# Patient Record
(demographics unavailable — no encounter records)

---

## 2025-06-30 NOTE — HISTORY OF PRESENT ILLNESS
[Home] : at home, [unfilled] , at the time of the visit. [Other Location: e.g. Home (Enter Location, City,State)___] : at [unfilled] [Telephone (audio)] : This telephonic visit was provided via audio only technology. [Verbal consent obtained from patient] : the patient, [unfilled] [FreeTextEntry1] : F/u post hospitalization at [facility]  [de-identified] : Brief Hospital Course copied: 71-year-old gentleman with PMH of HOCM (s/p septal myectomy in 2019) and VT cardiac arrest s/p ST Cristian AICD and mitral valve repair in [place] '20, Non-obstructive CAD via cardiac cath (in 2019), PE/ DVT (On Dabigatran), PAF (with Left atrial appendage closure in 2019 during MVR), HTN, HLD, DM, BPH, Bladder and prostate cancer (Chronic Levine Catheter) who presented to the ED with complaint of frequent falls and foul smelling urine. Patient states he has been unsteady on his feet for "many months", has fallen intermittently but fell three times today, denies hitting his head or losing consciousness but is on Pradaxa. He has had a Levine since October 2025, reports the urine has been "really smelly and a little bloody sometimes" for the last 2 weeks. Denies fever, chills, chest pain, SOB, N/V/D, abdominal pain, weakness, numbness, tingling, or any other complaints. Levine replaced in ED. Admitted for UTI and frequent falls CT HEAD: No acute intracranial hemorrhage, mass effect, or midline shift. Calvarium intact. Chronic microvascular ischemic changes, with interval chronic infarct identified within the left parietal deep white matter. CT CERVICAL SPINE: No acute fracture or traumatic subluxation. Multilevel cervical spondylosis described above. Stable anterior displacement of the bilateral condylar head at the temporomandibular joints, without fracture.   Telephone visit made with pt. He is alert and oriented x 3. Pt and his wife preset. JUDITH Naik also present. Pt has radical prostatectomy not with indwelling levine, unable to leave home for medical f/us due to social/financial/transportation issues. RN to place SW for assistance with issues. Encouraged p to maintain adequate hydration as they are reporting dark color to his urine. RN denies any s/s infection noted. Holding Amlodipine and Metoprolol as pt with dizziness and falls - BP today 118/60 wife reports systolic in the 80s at nights.  RN/SW to assist pt with getting a visiting provider.  Pt still smoking states "this is my one vice so I'm not wanting to stop now", Instructed him on safe smoking and cutting down.

## 2025-06-30 NOTE — PLAN
[FreeTextEntry1] : 1.continue all medications as prescribed 2. f/u with CARDS/PCP 3. Maintain diabetic, DASH diet 4. maintain fall precaution

## 2025-06-30 NOTE — ASSESSMENT
[FreeTextEntry1] : 71-year-old gentleman with PMH of HOCM (s/p septal myectomy in 2019) and VT cardiac arrest s/p ST Cristian AICD and mitral valve repair in [place] '20, Non-obstructive CAD via cardiac cath (in 2019), PE/ DVT (On Dabigatran), PAF (with Left atrial appendage closure in 2019 during MVR), HTN, HLD, DM, BPH, Bladder and prostate cancer (Chronic Argueta Catheter) who presented to the ED with complaint of frequent falls and foul smelling urine. Patient states he has been unsteady on his feet for "many months", has fallen intermittently but fell three times today, denies hitting his head or losing consciousness but is on Pradaxa.

## 2025-06-30 NOTE — REVIEW OF SYSTEMS
[Fatigue] : fatigue [Fever] : no fever [Chills] : no chills [Vision Problems] : no vision problems [Hearing Loss] : no hearing loss

## 2025-06-30 NOTE — HISTORY OF PRESENT ILLNESS
[Home] : at home, [unfilled] , at the time of the visit. [Other Location: e.g. Home (Enter Location, City,State)___] : at [unfilled] [Telephone (audio)] : This telephonic visit was provided via audio only technology. [Verbal consent obtained from patient] : the patient, [unfilled] [FreeTextEntry1] : F/u post hospitalization at [facility]  [de-identified] : Brief Hospital Course copied: 71-year-old gentleman with PMH of HOCM (s/p septal myectomy in 2019) and VT cardiac arrest s/p ST Cristian AICD and mitral valve repair in [place] '20, Non-obstructive CAD via cardiac cath (in 2019), PE/ DVT (On Dabigatran), PAF (with Left atrial appendage closure in 2019 during MVR), HTN, HLD, DM, BPH, Bladder and prostate cancer (Chronic Levine Catheter) who presented to the ED with complaint of frequent falls and foul smelling urine. Patient states he has been unsteady on his feet for "many months", has fallen intermittently but fell three times today, denies hitting his head or losing consciousness but is on Pradaxa. He has had a Levine since October 2025, reports the urine has been "really smelly and a little bloody sometimes" for the last 2 weeks. Denies fever, chills, chest pain, SOB, N/V/D, abdominal pain, weakness, numbness, tingling, or any other complaints. Levine replaced in ED. Admitted for UTI and frequent falls CT HEAD: No acute intracranial hemorrhage, mass effect, or midline shift. Calvarium intact. Chronic microvascular ischemic changes, with interval chronic infarct identified within the left parietal deep white matter. CT CERVICAL SPINE: No acute fracture or traumatic subluxation. Multilevel cervical spondylosis described above. Stable anterior displacement of the bilateral condylar head at the temporomandibular joints, without fracture.   Telephone visit made with pt. He is alert and oriented x 3. Pt and his wife preset. JUDITH Naik also present. Pt has radical prostatectomy not with indwelling levine, unable to leave home for medical f/us due to social/financial/transportation issues. RN to place SW for assistance with issues. Encouraged p to maintain adequate hydration as they are reporting dark color to his urine. RN denies any s/s infection noted. Holding Amlodipine and Metoprolol as pt with dizziness and falls - BP today 118/60 wife reports systolic in the 80s at nights.  RN/SW to assist pt with getting a visiting provider.  Pt still smoking states "this is my one vice so I'm not wanting to stop now", Instructed him on safe smoking and cutting down.